# Patient Record
Sex: FEMALE | Race: WHITE | NOT HISPANIC OR LATINO | Employment: FULL TIME | URBAN - METROPOLITAN AREA
[De-identification: names, ages, dates, MRNs, and addresses within clinical notes are randomized per-mention and may not be internally consistent; named-entity substitution may affect disease eponyms.]

---

## 2017-04-05 ENCOUNTER — HOSPITAL ENCOUNTER (OUTPATIENT)
Dept: LAB | Facility: MEDICAL CENTER | Age: 42
End: 2017-04-05
Attending: PHYSICIAN ASSISTANT
Payer: COMMERCIAL

## 2017-04-05 LAB
ESTRADIOL SERPL-MCNC: <20 PG/ML
FSH SERPL-ACNC: 86.3 MIU/ML
T4 FREE SERPL-MCNC: 0.84 NG/DL (ref 0.53–1.43)
THYROPEROXIDASE AB SERPL-ACNC: 2.3 IU/ML (ref 0–9)
TSH SERPL DL<=0.005 MIU/L-ACNC: 0.92 UIU/ML (ref 0.3–3.7)

## 2017-04-05 PROCEDURE — 84439 ASSAY OF FREE THYROXINE: CPT

## 2017-04-05 PROCEDURE — 86376 MICROSOMAL ANTIBODY EACH: CPT

## 2017-04-05 PROCEDURE — 84443 ASSAY THYROID STIM HORMONE: CPT

## 2017-04-05 PROCEDURE — 36415 COLL VENOUS BLD VENIPUNCTURE: CPT

## 2017-04-05 PROCEDURE — 86800 THYROGLOBULIN ANTIBODY: CPT

## 2017-04-05 PROCEDURE — 82670 ASSAY OF TOTAL ESTRADIOL: CPT

## 2017-04-05 PROCEDURE — 83001 ASSAY OF GONADOTROPIN (FSH): CPT

## 2017-04-05 PROCEDURE — 84432 ASSAY OF THYROGLOBULIN: CPT | Mod: 91

## 2017-04-07 LAB
THYROGLOB AB SERPL-ACNC: <0.9 IU/ML (ref 0–4)
THYROGLOB SERPL-MCNC: 10.6 NG/ML (ref 1.3–31.8)
THYROGLOB SERPL-MCNC: NORMAL NG/ML (ref 1.3–31.8)

## 2017-07-21 ENCOUNTER — HOSPITAL ENCOUNTER (OUTPATIENT)
Dept: RADIOLOGY | Facility: MEDICAL CENTER | Age: 42
End: 2017-07-21
Attending: SPECIALIST
Payer: COMMERCIAL

## 2017-07-21 DIAGNOSIS — R92.30 DENSE BREASTS: ICD-10-CM

## 2017-07-21 DIAGNOSIS — R92.2 DENSE BREASTS: ICD-10-CM

## 2017-07-21 DIAGNOSIS — Z12.31 VISIT FOR SCREENING MAMMOGRAM: ICD-10-CM

## 2017-07-21 PROCEDURE — 77063 BREAST TOMOSYNTHESIS BI: CPT

## 2017-09-01 ENCOUNTER — APPOINTMENT (OUTPATIENT)
Dept: RADIOLOGY | Facility: MEDICAL CENTER | Age: 42
End: 2017-09-01
Attending: SPECIALIST
Payer: COMMERCIAL

## 2017-09-01 DIAGNOSIS — R92.2 DENSE BREASTS: ICD-10-CM

## 2017-09-01 DIAGNOSIS — R92.30 DENSE BREASTS: ICD-10-CM

## 2017-09-28 ENCOUNTER — TELEPHONE (OUTPATIENT)
Dept: RADIOLOGY | Facility: MEDICAL CENTER | Age: 42
End: 2017-09-28

## 2017-09-28 NOTE — TELEPHONE ENCOUNTER
LM to conf apt @ [facility] on 9/28 @ 7:30 check in @ 7:15, reviewed prep and location

## 2022-09-09 ENCOUNTER — HOSPITAL ENCOUNTER (OUTPATIENT)
Dept: RADIOLOGY | Facility: MEDICAL CENTER | Age: 47
End: 2022-09-09
Attending: INTERNAL MEDICINE
Payer: COMMERCIAL

## 2022-09-09 DIAGNOSIS — Z12.31 VISIT FOR SCREENING MAMMOGRAM: ICD-10-CM

## 2022-09-09 PROCEDURE — 77063 BREAST TOMOSYNTHESIS BI: CPT

## 2023-10-13 ENCOUNTER — OFFICE VISIT (OUTPATIENT)
Dept: SLEEP MEDICINE | Facility: MEDICAL CENTER | Age: 48
End: 2023-10-13
Attending: STUDENT IN AN ORGANIZED HEALTH CARE EDUCATION/TRAINING PROGRAM
Payer: COMMERCIAL

## 2023-10-13 VITALS
OXYGEN SATURATION: 95 % | WEIGHT: 213 LBS | HEIGHT: 66 IN | DIASTOLIC BLOOD PRESSURE: 68 MMHG | BODY MASS INDEX: 34.23 KG/M2 | RESPIRATION RATE: 16 BRPM | HEART RATE: 73 BPM | SYSTOLIC BLOOD PRESSURE: 124 MMHG

## 2023-10-13 DIAGNOSIS — Z23 NEED FOR INFLUENZA VACCINATION: ICD-10-CM

## 2023-10-13 DIAGNOSIS — F51.04 CHRONIC INSOMNIA: ICD-10-CM

## 2023-10-13 DIAGNOSIS — Z78.9 INTOLERANCE OF CONTINUOUS POSITIVE AIRWAY PRESSURE (CPAP) VENTILATION: ICD-10-CM

## 2023-10-13 DIAGNOSIS — G47.33 OSA (OBSTRUCTIVE SLEEP APNEA): Primary | ICD-10-CM

## 2023-10-13 PROCEDURE — 90686 IIV4 VACC NO PRSV 0.5 ML IM: CPT

## 2023-10-13 PROCEDURE — 3074F SYST BP LT 130 MM HG: CPT | Performed by: STUDENT IN AN ORGANIZED HEALTH CARE EDUCATION/TRAINING PROGRAM

## 2023-10-13 PROCEDURE — 3078F DIAST BP <80 MM HG: CPT | Performed by: STUDENT IN AN ORGANIZED HEALTH CARE EDUCATION/TRAINING PROGRAM

## 2023-10-13 PROCEDURE — 99212 OFFICE O/P EST SF 10 MIN: CPT | Performed by: STUDENT IN AN ORGANIZED HEALTH CARE EDUCATION/TRAINING PROGRAM

## 2023-10-13 PROCEDURE — 90686 IIV4 VACC NO PRSV 0.5 ML IM: CPT | Performed by: STUDENT IN AN ORGANIZED HEALTH CARE EDUCATION/TRAINING PROGRAM

## 2023-10-13 PROCEDURE — 99204 OFFICE O/P NEW MOD 45 MIN: CPT | Mod: 25 | Performed by: STUDENT IN AN ORGANIZED HEALTH CARE EDUCATION/TRAINING PROGRAM

## 2023-10-13 PROCEDURE — 90471 IMMUNIZATION ADMIN: CPT | Performed by: STUDENT IN AN ORGANIZED HEALTH CARE EDUCATION/TRAINING PROGRAM

## 2023-10-13 RX ORDER — ESTRADIOL 0.04 MG/D
PATCH TRANSDERMAL
COMMUNITY
Start: 2023-10-05

## 2023-10-13 RX ORDER — FLUTICASONE PROPIONATE 50 MCG
SPRAY, SUSPENSION (ML) NASAL
COMMUNITY
Start: 2023-09-17

## 2023-10-13 RX ORDER — PANTOPRAZOLE SODIUM 20 MG/1
TABLET, DELAYED RELEASE ORAL
COMMUNITY
Start: 2023-08-31

## 2023-10-13 RX ORDER — BUPROPION HYDROCHLORIDE 150 MG/1
TABLET, EXTENDED RELEASE ORAL
COMMUNITY
Start: 2023-09-17

## 2023-10-13 RX ORDER — SUMATRIPTAN 20 MG/1
SPRAY NASAL
COMMUNITY
Start: 2023-09-17

## 2023-10-13 RX ORDER — PROPRANOLOL HYDROCHLORIDE 80 MG/1
CAPSULE, EXTENDED RELEASE ORAL
COMMUNITY
Start: 2023-07-30

## 2023-10-13 RX ORDER — FLUOXETINE HYDROCHLORIDE 40 MG/1
CAPSULE ORAL
COMMUNITY
Start: 2023-10-07

## 2023-10-13 ASSESSMENT — PATIENT HEALTH QUESTIONNAIRE - PHQ9
SUM OF ALL RESPONSES TO PHQ QUESTIONS 1-9: 4
CLINICAL INTERPRETATION OF PHQ2 SCORE: 1
5. POOR APPETITE OR OVEREATING: 0 - NOT AT ALL

## 2023-10-13 NOTE — PROGRESS NOTES
[facility] Consult Note     Date: 10/13/2023 / Time: 2:13 PM      Thank you for requesting a sleep medicine consultation on Lissy Henson at the sleep center. Presents today with the chief complaints of establishing care for management of obstructive sleep apnea. She is referred by Hakan Kan M.D.  [address] [place] [id number] for evaluation and treatment of obstructive sleep apnea on CPAP.     HISTORY OF PRESENT ILLNESS:     Lissy Henson is a 48 y.o. female with migraines, MDD, GERD, and severe obstructive sleep apnea.  Presents to Sleep Clinic for evaluation of obstructive sleep apnea.     She was diagnosed with severe obstructive sleep apnea through a 2 night home sleep study at the end of September 2021.  Study indicated an AHI of 52 events an hour.    Following diagnosis she was set up through [facility] who mailed her a CPAP machine with a full facemask.  She states she never underwent a mask fit and has only tried her auto CPAP.  She has been trying to use her CPAP more regularly but finds it difficult.  She finds the whole apparatus cumbersome and the pressure is difficult to tolerate at times.  She has been struggling with using her CPAP machine for the past 2 years.    She ideally would like to use her machine nightly.  She is hopeful that treating her sleep apnea may be able to improve her daytime symptoms and chronic medical conditions.    She is excessively sleepy at times.  This does impact her functionality during the day at times.  She has brain fog at times as well as low energy.    As per supplemental questionnaire to be scanned or imported into chart:    Lancing Sleepiness Score: 15    Sleep Schedule  Bedtime: Weekday 830-9pm Weekend 10pm-12am  Wake time: Weekday 4am Weekend 8am   Sleep-onset latency: mins to hours, varies day to day, 2-3 days > 30min   Awakenings from sleep: 1-2  Difficulty falling back asleep: generally  "not   Bedroom partner: No  Naps: No     DAYTIME SYMPTOMS:   Excessive daytime sleepiness: Yes  Daytime fatigue: Yes  Difficulty concentrating: Yes  Memory problems: Yes slight   Irritability:Yes  Work/school performance issues: No   Sleepiness with driving: Yes, can feel tired at times   Caffeine/stimulant use: Yes  Alcohol use:Yes, How Many? Occasional      SLEEP RELATED SYMPTOMS  Snoring: Yes  Witnessed apnea or gasping/choking: Yes  Dry mouth or mouth breathing: Yes  Sweating: Yes  Teeth grinding/biting: Yes  Morning headaches: Yes occasionally   Refreshed Upon Awakening: No      SLEEP RELATED BEHAVIORS:  Parasomnias (walking, talking, eating, violence): No   Leg kicking: No   Restless legs - \"urge to move\": Yes  Nightmares: No  Recurrent: No   Dream enactment: No      NARCOLEPSY:  Cataplexy: No   Sleep paralysis: Yes, three times in last year   Sleep attacks: No   Hypnagogic/hypnopompic hallucinations: No     MEDICAL HISTORY  Past Medical History:   Diagnosis Date    Migraines     Other specified disorder of gallbladder     Pain     back/leg    Psychiatric problem     anxiety, depression        SURGICAL HISTORY  Past Surgical History:   Procedure Laterality Date    [place] NJX AA&/STRD TFRML EPI LUMBAR/SACRAL 1 LEVEL  10/1/2014    Performed by Quique Ugarte M.D. at [facility] ORS    FLORIDALMA BY LAPAROSCOPY  2/19/2014    Performed by Elyse Alexander M.D. at [facility]ERY [facility] DAY [facility] ORS    [place] NJX AA&/STRD TFRML EPI LUMBAR/SACRAL 1 LEVEL  3/20/2012    Performed by ANA PAULA MCDONALD at [facility] ORS    INJ,EPIDURAL/LUMB/SAC SINGLE  1/24/2012    Performed by ANA PAULA MCDONALD at [facility] ORS    TONSILLECTOMY  4/9/08    Performed by REINA JAIN at SURGERY SAME DAY [facility] ORS    GYN SURGERY  2006    hysterectomy        FAMILY HISTORY  History reviewed. No pertinent family history.    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     CURRENT MEDICATIONS  Current Outpatient Medications   Medication Sig Dispense Refill    sumatriptan (IMITREX) 20 MG/ACT nasal spray USE 1 DOSE IN THE NOSE ONCE DAILY AT THE ONSET OF MIGRAINE HEADACHE MAY REPEAT DOSE ONCE IN TWO HOURS IF NEEDED NO MORE THAN 2 DOSES IN 24 HOURS      propranolol CR (INDERAL LA) 80 MG CAPSULE SR 24 HR       pantoprazole (PROTONIX) 20 MG tablet TAKE 1 TABLET BY MOUTH ONCE DAILY IN THE MORNING 30 MINUTES BEFORE A MEAL      fluticasone (FLONASE) 50 MCG/ACT nasal spray USE 2 SPRAY(S) IN EACH NOSTRIL ONCE DAILY AS NEEDED FOR ALLERGIES AND FOR CONGESTION      fluoxetine (PROZAC) 40 MG capsule TAKE 1 CAPSULE BY MOUTH ONCE DAILY FOR DEPRESSION      buPROPion SR (WELLBUTRIN-SR) 150 MG TABLET SR 12 HR sustained-release tablet TAKE 1 TABLET BY MOUTH ONCE DAILY IN THE MORNING AFTER BREAKFAST FOR DEPRESSION      estradiol (CLIMARA) 0.0375 MG/24HR patch APPLY 1 PATCH TOPICALLY ONCE A WEEK STOP 0.025MG PATCH       No current facility-administered medications for this visit.       REVIEW OF SYSTEMS  Constitutional: Denies fevers, Denies weight changes  Ears/Nose/Throat/Mouth: Denies nasal congestion or sore throat   Cardiovascular: Denies chest pain  Respiratory: Denies shortness of breath, Denies cough  Gastrointestinal/Hepatic: Denies nausea, vomiting  Sleep: see HPI    Physical Examination:  Vitals/ General Appearance:   Weight/BMI: Body mass index is 34.38 kg/m².  /68 (BP Location: Left arm, Patient Position: Sitting, BP Cuff Size: Large adult)   Pulse 73   Resp 16   Ht 1.676 m (5' 6\")   Wt 96.6 kg (213 lb)   SpO2 95%   Vitals:    10/13/23 1403   BP: 124/68   BP Location: Left arm   Patient Position: Sitting   BP " "Cuff Size: Large adult   Pulse: 73   Resp: 16   SpO2: 95%   Weight: 96.6 kg (213 lb)   Height: 1.676 m (5' 6\")       Pt. is alert and oriented to time, place and person. Cooperative and in no apparent distress.     Constitutional: Alert, no distress, well-groomed.  Skin: No rashes in visible areas.  Eye: Round. Conjunctiva clear, lids normal. No icterus.   ENT EXAM  Nasal alae/valves collapsible: No   Nasal septum deviation: No   Nasal turbinate hypertrophy: Left: Grade 1   Right: Grade 1  Hard palate narrow: Yes  Hard palate high: No   Soft palate/uvula (Mallampati score): 2  Tongue Scalloping: No   Retrognathia: No   Micrognathia: No   Cardiovascular:no murmus/gallops/rubs, normal S1 and S2 heart sounds, regular rate and rhythm  Pulmonary:Clear to auscultation, No wheezes, No crackles.  Neurologic:Awake, alert and oriented x 3, Normal age appropriate gait, No involuntary motions.  Extremities: No clubbing, cyanosis, or edema       ASSESSMENT AND PLAN   Lissy Henson is a 48 y.o. female with migraines, MDD, GERD, and severe obstructive sleep apnea.  Presents to Sleep Clinic for evaluation of obstructive sleep apnea.     1. Lissy Henson  has of Obstructive Sleep Apnea (CRISELDA). Lissy Henson has symptoms of snoring, choking/gasping during sleep, witnessed apnea, dry mouth, morning headaches, unrefreshed upon awakening.     ESS 15  Pt has risk factors for CRISELDA include obesity, and age   The pathophysiology of CRISELDA and the increased risk of cardiovascular morbidity from untreated CRISELDA is discussed in detail with the patient. She  also has MDD, GERD, migraines which can be worsened by CRISELDA.    She has been intolerant to auto CPAP therapy at home.  Given her frustration with mask and pressures she would benefit from undergoing a in lab CPAP titration study.  There is potential that her pressures can be adjusted which she would find more comfortable or potentially needing " BiPAP therapy.  She is excessively sleepy during the day which is impacting her functionality.  Treating her sleep apnea may improve her daytime symptoms.        Plan  -  She  will be scheduled for an overnight PSG titration study.  - Follow up 1-2 weeks after sleep study to discuss results and treatment options moving forward   -Advised to reach out via MyChart or by phone with any questions or concerns.     2.  Regarding treatment of other past medical problems and general health maintenance,  Pt is urged to follow up with PCP.      Please note portions of this record was created using voice recognition software. I have made every reasonable attempt to correct obvious errors, but I expect that there are errors of grammar and possibly content I did not discover before finalizing the note.

## 2023-11-04 ENCOUNTER — SLEEP STUDY (OUTPATIENT)
Dept: SLEEP MEDICINE | Facility: MEDICAL CENTER | Age: 48
End: 2023-11-04
Attending: STUDENT IN AN ORGANIZED HEALTH CARE EDUCATION/TRAINING PROGRAM
Payer: COMMERCIAL

## 2023-11-04 DIAGNOSIS — Z78.9 INTOLERANCE OF CONTINUOUS POSITIVE AIRWAY PRESSURE (CPAP) VENTILATION: ICD-10-CM

## 2023-11-04 DIAGNOSIS — F51.04 CHRONIC INSOMNIA: ICD-10-CM

## 2023-11-04 DIAGNOSIS — G47.33 OSA (OBSTRUCTIVE SLEEP APNEA): ICD-10-CM

## 2023-11-04 PROCEDURE — 95811 POLYSOM 6/>YRS CPAP 4/> PARM: CPT | Performed by: STUDENT IN AN ORGANIZED HEALTH CARE EDUCATION/TRAINING PROGRAM

## 2023-11-06 NOTE — PROCEDURES
MONTAGE: Standard  STUDY TYPE: Treatment  RECORDING TECHNIQUE:   After the scalp was prepared, gold plated electrodes were applied to the scalp according to the International 10-20 System. EEG (electroencephalogram) was continuously monitored from the O1-M2, O2-M1, C3-M2, C4-M1, F3-M2, and F4-M1. EOGs (electrooculograms) were monitored by electrodes placed at the left and right outer canthi. Chin EMG (electromyogram) was monitored by electrodes placed on the mentalis and sub-mentalis muscles. Nasal and oral airflow were monitored using a triple port thermocouple as well as oronasal pressure transducer. Respiratory effort was measured by inductive plethysmography technology employing abdominal and thoracic belts. Blood oxygen saturation and pulse were monitored by pulse oximetry. Heart rhythm was monitored by surface electrocardiogram. Leg EMG was studied using surface electrodes placed on left and right anterior tibialis. A microphone was used to monitor tracheal sounds and snoring. Body position was monitored and documented by technician observation.   SCORING CRITERIA:   A modification of the AASM manual for scoring of sleep and associated events was used. Obstructive apneas were scored by cessation of airflow for at least 10 seconds with continuing respiratory effort. Central apneas were scored by cessation of airflow for at least 10 seconds with no respiratory effort. Hypopneas were scored by a 30% or more reduction in airflow for at least 10 seconds accompanied by arterial oxygen desaturation of 3% or an arousal. For CMS (Medicare) patients, per AASM rule 1B, hypopneas are scored by 30% with mild reduction in airflow for at least 10 seconds accompanied by arterial saturation decreased at 4%.  Study start time was 08:07:58 PM. Diagnostic recording time was 11h 0.5m with a total sleep time of 8h 59.5m resulting in a sleep efficiency of 81.68%%. Sleep latency from the start of the study was 17 minutes and the  latency from sleep to REM was 120 minutes. In total,36 arousals were scored for an arousal index of 4.0.  Respiratory:  There were a total of 79 apneas consisting of 29 obstructive apneas, 0 mixed apneas, and 50 central apneas. A total of 63 hypopneas were scored. The apnea index was 8.79 per hour and the hypopnea index was 7.01 per hour resulting in an overall AHI of 15.79. AHI during REM was 11.8 and AHI while supine was 15.08.  Central apneas accounted for 35% of respiratory events  Oximetry:  There was a mean oxygen saturation of 94.0%. The minimum oxygen saturation in NREM was 84.0 % and in REM was 86.0%. The patient spent 3.5 minutes of TST with SaO2 <88%.  Cardiac:  The highest heart rate seen while awake was 84 BPM while the highest heart rate during sleep was 81 BPM with an average sleeping heart rate of 63 BPM.  Limb Movements:  There were a total of 69 PLMs during sleep which resulted in a PLMS index of 7.7. Of these, 8 were associated with arousals which resulted in a PLMS arousal index of 0.9.  Titration: CPAP was tried from 5-11cm H2O. Bipap was tried from 13/9-16/10cm H2O.  This was a fully attended sleep study. This test was technically adequate. This patient was titrated on CPAP starting at 5 cm of water pressure. Patient was titrated up to BiPAP 16/10 of water pressure. Patient did best at BiPAP 13/7 cm of water pressure. Patient spent 28 minutes at that pressure and the AHI was 8.6 which is considered mild obstructive sleep apnea.      Impression:  1.  Patient used CPAP and BiPAP during night of study  2.  Supine REM sleep was seen on PAP therapy  3.  There was improvement in respiratory events with CPAP as well compared to baseline.  4.  Oxygen saturations appear to improve with BiPAP therapy over CPAP therapy    Recommendations:  I recommend auto BiPAP EPAP 7 IPAP 15 pressure support 4 cmH2O.  Patient used a large Rishabh mask during night of study.     I also recommend 30 day compliance download  to assess the efficacy to the recommended pressure, measure leak, apnea hypopnea index and compliance for further outpatient monitoring and management of PAP therapy. In some cases alternative treatment options may be proven effective in resolving sleep apnea. These options include upper airway surgery, the use of a dental orthotic, weight loss, or positional therapy. Clinical correlation is required. In general patients with sleep apnea are advised to avoid alcohol, sedatives and not to operate a motor vehicle while drowsy.  Untreated sleep apnea increases the risk for cardiovascular and neurovascular disease.

## 2024-01-12 ENCOUNTER — APPOINTMENT (OUTPATIENT)
Dept: SLEEP MEDICINE | Facility: MEDICAL CENTER | Age: 49
End: 2024-01-12
Attending: STUDENT IN AN ORGANIZED HEALTH CARE EDUCATION/TRAINING PROGRAM
Payer: COMMERCIAL

## 2024-02-27 ENCOUNTER — APPOINTMENT (OUTPATIENT)
Dept: SLEEP MEDICINE | Facility: MEDICAL CENTER | Age: 49
End: 2024-02-27
Attending: STUDENT IN AN ORGANIZED HEALTH CARE EDUCATION/TRAINING PROGRAM
Payer: COMMERCIAL

## 2024-02-27 VITALS — BODY MASS INDEX: 31.34 KG/M2 | WEIGHT: 195 LBS | HEIGHT: 66 IN

## 2024-02-27 DIAGNOSIS — G47.33 OSA (OBSTRUCTIVE SLEEP APNEA): Primary | ICD-10-CM

## 2024-02-27 PROCEDURE — 99213 OFFICE O/P EST LOW 20 MIN: CPT | Mod: 95 | Performed by: STUDENT IN AN ORGANIZED HEALTH CARE EDUCATION/TRAINING PROGRAM

## 2024-02-27 ASSESSMENT — PATIENT HEALTH QUESTIONNAIRE - PHQ9: CLINICAL INTERPRETATION OF PHQ2 SCORE: 0

## 2024-02-27 NOTE — PROGRESS NOTES
[facility] Virtual Follow Up Note     Date: 2024 / Time: 9:10 AM    CC: Telemedicine sleep follow-up    This sleep consultation is provided using Telemedicine and secure encrypted software. Consent was obtained.    Consulting MD: Deni Hansen M.D.  Requesting Physician: Hakan Kan M.D.  Patient name:      Lissy Henson  : 1975  MRN: [id number]     This visit was conducted via Zoom using secure and encrypted videoconferencing technology.   The patient was in a private location in the [facility].    The patient's identity was confirmed and verbal consent was obtained for this virtual visit.      HISTORY OF PRESENT ILLNESS:     Lissy Henson is a 49 y.o. female with migraines, MDD, GERD, and severe obstructive sleep apnea on CPAP.  Presents today to follow-up regarding management of obstructive sleep apnea.    Since last visit she has undergone a PSG titration study.  She found the night of the sleep study went well.  She found that the mask helped her use her Pap machine.  She did not find it to be too intrusive to her sleep.  She felt benefit from using the PAP machine during the night of the study.  She did review the study results prior to today's visit      DME provider: [facility]  Device: CPAP  Mask: Rishabh Saals Hx   severe obstructive sleep apnea through a 2 night home sleep study at the end of 2021. Study indicated an AHI of 52 events an hour.     MEDICAL HISTORY  Past Medical History:   Diagnosis Date    Migraines     Other specified disorder of gallbladder     Pain     back/leg    Psychiatric problem     anxiety, depression        SURGICAL HISTORY  Past Surgical History:   Procedure Laterality Date    [place] NJX AA&/STRD TFRML EPI LUMBAR/SACRAL 1 LEVEL  10/1/2014    Performed by Quique Ugarte M.D. [facility]    FLORIDALMA BY LAPAROSCOPY  2014    Performed by Elyse Alexander M.D. at SUR[facility]ERY [facility] DAY  [facility]    [place] NJX AA&/STRD TFRML EPI LUMBAR/SACRAL 1 LEVEL  3/20/2012    Performed by ANA PAULA MCDONALD at [facility]    INJ,EPIDURAL/LUMB/SAC SINGLE  1/24/2012    Performed by ANA PAULA MCDONALD at [facility]    TONSILLECTOMY  4/9/08    Performed by REINA JAIN at [facility]ERY [facility] DAY [facility]    GYN SURGERY  2006    hysterectomy        FAMILY HISTORY  No family history on file.    SOCIAL HISTORY  Social History     Socioeconomic History    Marital status:    Tobacco Use    Smoking status: Former    Smokeless tobacco: Never   Vaping Use    Vaping Use: Never used   Substance and Sexual Activity    Alcohol use: Yes     Comment: occ    Drug use: No        CURRENT MEDICATIONS  Current Outpatient Medications   Medication Sig Dispense Refill    sumatriptan (IMITREX) 20 MG/ACT nasal spray USE 1 DOSE IN THE NOSE ONCE DAILY AT THE ONSET OF MIGRAINE HEADACHE MAY REPEAT DOSE ONCE IN TWO HOURS IF NEEDED NO MORE THAN 2 DOSES IN 24 HOURS      propranolol CR (INDERAL LA) 80 MG CAPSULE SR 24 HR       pantoprazole (PROTONIX) 20 MG tablet TAKE 1 TABLET BY MOUTH ONCE DAILY IN THE MORNING 30 MINUTES BEFORE A MEAL      fluticasone (FLONASE) 50 MCG/ACT nasal spray USE 2 SPRAY(S) IN EACH NOSTRIL ONCE DAILY AS NEEDED FOR ALLERGIES AND FOR CONGESTION      fluoxetine (PROZAC) 40 MG capsule TAKE 1 CAPSULE BY MOUTH ONCE DAILY FOR DEPRESSION      buPROPion SR (WELLBUTRIN-SR) 150 MG TABLET SR 12 HR sustained-release tablet TAKE 1 TABLET BY MOUTH ONCE DAILY IN THE MORNING AFTER BREAKFAST FOR DEPRESSION      estradiol (CLIMARA) 0.0375 MG/24HR patch APPLY 1 PATCH TOPICALLY ONCE A WEEK STOP 0.025MG PATCH       No current facility-administered medications for this visit.       REVIEW OF SYSTEMS  Constitutional: Denies fevers, Denies weight changes  Ears/Nose/Throat/Mouth: Denies nasal congestion or sore throat   Cardiovascular: Denies chest pain or palpitations   Respiratory: Denies shortness of  "breath, Denies cough  Gastrointestinal/Hepatic: Denies abdominal pain, nausea, vomiting, diarrhea  Musculoskeletal/Rheum: Denies  joint pain and swelling   Sleep: See HPI      Physical Examination:  Vitals/ General Appearance:   Weight/BMI: Body mass index is 31.47 kg/m².  Ht 1.676 m (5' 6\")   Wt 88.5 kg (195 lb)   Vitals:    02/27/24 0909   Weight: 88.5 kg (195 lb)   Height: 1.676 m (5' 6\")       General: alert and oriented to time, place and person. Cooperative and in no apparent distress.   Constitutional: Alert, no distress, well-groomed.  Voice: normal volume and minerva  Head: normocephalic   Pulmonary: Voice normal volume and minerva. No sounds or signs of increased work of breathing.   Neurologic: No involuntary movements     Assessment and Plan  Lissy Henson is a 49 y.o. female with migraines, MDD, GERD, and severe obstructive sleep apnea on CPAP.  Presents today to follow-up regarding management of obstructive sleep apnea.    The medical record was reviewed.    Obstructive sleep apnea  Reviewed past PSG titration study.  Advised that she would benefit in respiratory events with both CPAP and BiPAP.  She does have a CPAP at home.  She felt that the mask fitting and using the mask she used in the end of study was a big improvement than her previous set up.  After discussion she would like to continue using CPAP at home for the time being to see if she can adapt to it before trying to get a new machine.  She is unaware of her settings on her home CPAP machine.  She does go through [facility].  After discussion we will plan to proceed with CPAP therapy.  Recommended pressure adjustment to auto CPAP 7-11 cmH2O.  Advised to change to an Rishabh mask     PLAN:   -Order placed for supply renewal   -Order placed for pressure change  -Advised to reach out via MyChart with any questions     Has been advised to continue the current CPAP, clean equipment frequently, and get new mask and " supplies as allowed by insurance and DME. Recommend an earlier appointment, if significant treatment barriers develop.    Patients with CRISELDA are at increased risk of cardiovascular disease including coronary artery disease, systemic arterial hypertension, pulmonary arterial hypertension, cardiac arrythmias, and stroke. The patient was advised to avoid driving a motor vehicle when drowsy.    Positive airway pressure will favorably impact many of the adverse conditions and effects provoked by CRISELDA.    Have advised the patient to follow up with the appropriate healthcare practitioners for all other medical problems and issues.    Return in about 2 months (around 4/27/2024) for televisit .      Please note portions of this record was created using voice recognition software. I have made every reasonable attempt to correct obvious errors, but I expect that there are errors of grammar and possibly content I did not discover before finalizing the note.